# Patient Record
Sex: FEMALE | Race: WHITE | URBAN - METROPOLITAN AREA
[De-identification: names, ages, dates, MRNs, and addresses within clinical notes are randomized per-mention and may not be internally consistent; named-entity substitution may affect disease eponyms.]

---

## 2018-06-04 ENCOUNTER — THERAPY VISIT (OUTPATIENT)
Dept: PHYSICAL THERAPY | Facility: CLINIC | Age: 53
End: 2018-06-04
Payer: COMMERCIAL

## 2018-06-04 DIAGNOSIS — R10.2 PELVIC PAIN IN FEMALE: Primary | ICD-10-CM

## 2018-06-04 PROCEDURE — 97161 PT EVAL LOW COMPLEX 20 MIN: CPT | Mod: GP | Performed by: PHYSICAL THERAPIST

## 2018-06-04 PROCEDURE — 97530 THERAPEUTIC ACTIVITIES: CPT | Mod: GP | Performed by: PHYSICAL THERAPIST

## 2018-06-04 NOTE — PROGRESS NOTES
[facility] Initial Evaluation  Subjective:  Patient is a 53 year old female presenting with rehab pelvic hpi. The history is provided by the patient. No  was used.                                      Past medical history: insulin resistance.  Medical allergies: none.  Other surgeries include:  None.  Medication history: Metformin, Vit D, Topamax.  Current occupation is [employer]  .  Patient is currently not working due to present treatment problem.      Barriers include:  None as reported by the patient.    Red flags:  None as reported by the patient.    SUBJECTIVE:  Patient reports onset of symptoms Pain originally started in 2016 and at that time had stopped having sex. Did attend PT at that time which did help. Gradually, started to initiate sex after completion of PT. Notes that she's more sexually active than before but still having some discomfort, arun with initial penetration. Symptoms include pain with intercourse yet. Wants me to check to see if there are any knots in her pelvic floor. Her  has been trying to do some finger penetration before sexual intercourse and that does seem to help. MD is debating trying to remove some belly fat/skin due to weight loss and having the extra skin. Pain that remains is a sharp pain and it's the initial penetration. Pt notes she tries to relax and sometimes does feel that maybe it's her. Also, notices that it's the position. Wants to be on her back and notes that hurts the most. Pt also notes that her  is curved to the right quite significantly.  Since onset symptoms have been getting better, worse or staying the same? Doing much better overall, just wants a recheck and wants opinion on the tummy tuck.      Verbal permission from patient to do internal pelvis muscle assessment? Yes Verbal permission to complete external perineal assessment? Yes Would you like someone else in the room as a   chaperone?  No      Urination:  Do you leak on the way to the bathroom or with a strong urge to void? No   Do you leak with cough,sneeze, jumping, running?sometimes   Any other activities that cause leaking? No   Do you have triggers that make you feel you can't wait to go to the bathroom? No what are theyNA.  Type of pad and number used per day? None  When you leak what is the amount? Small  How long can you delay the need to urinate? several hours.   How many times do you get up to urinate at night? 0   Can you stop the flow of urine when on the toilet? Yes  Is the volume of urine passed usually: average. (8sec rule= 250ml with average bladder storing 400-600ml)  Do you feel empty when you are done? No  Do you strain to pass urine? No  Do you have a slow or hesitant urinary stream? No  Do you have difficulty initiating the urine stream? No  Is urination painful? No  How many bladder infections have you had in last 12 months? 0  Fluid intake(one glass is 8oz or one cup) 8 glasses/day, 0 caffinated glasses/day  0  alcohol glasses/day.    Bowel habits:  Frequency of bowel movements? 2 times a day  Consistancy of stool? soft formed, [place] Stool Scale 4  Do you ignore the urge to defecate? No  Do you strain to pass stool? No    Pelvic Pain:  Do you have any pelvic pain with intercourse, exams, use of tampons? Yes  Is initial penetration during intercourse painful? Yes  Is deeper penetration painful? No  Do you use lubricant? Yes What kind? Helps only if he starts with his fingers first  ?  Given birth? No Any complications? Not asked  # of vaginal delieveries?0  # of C-sections?0  # of episiotomies?0.  Are you sexually active?Yes  Have you ever been worried for your physical safety? No    Do you have any depression, anxiety, panic attacks, excessive   stress?  No  Any abdominal or pelvic surgeries?  Breast reduction in 1988, ACL 2009, Hysterectomy 2002, 2 C-sections 1985 & 1987,   Are you having any regular exercise?Yes,  stretches she was given before  Have you practiced the PF(kegel) exercises for 4 or more weeks? No, isn't supposed to be doing these  Thyroid checked?NA(related to hair loss, flu-like symptoms, wt gain/loss, fatigue, menopause)  Changed diet lately?No    OBSERVATION  Lumbar Posture: Negative  Pelvic symmetry: Negative  Introitus: unremarkable  Trendelenberg Sign:Not Tested: Not indicated     ROM  Single leg standing unilateral hip flexion PSIS:Negative  Standing forward flexion PSIS:Negative  Passive Hip ROM:Negative  HUSEYIN:Negative  HUSEYIN with OP:Negative      MUSCLE PERFORMANCE  Active SLR:Not Tested: Not indicated   Abdominal Core 90/90 hip lower:Not Tested: Not indicated   Baseline PF tone:normal with very mild tightness in levator ani B  PF Tone with cough: Able to hold without difficulty  Valsalva: no movement  PF Response quality: brisk - normal  PF Power: Center: 5 Stronger on right/left symmetrical.  Endurance: Maximum contraction in seconds: 10 seconds  # of endurance contractions before fatigue: NT  Quick contraction repetitions prior to fatigue: 8.  Specificity/accessory muscles: Not Tested, Synergy with abdominals: Not Tested.  Prolapse: Cyctocele/Rectocele/Uterine stgstrstastdstest:st st1st Urethrocele: none, Enterocele: none.    PALPATION: Pain: no pain or tenderness to palpation noted.  Objective:  System    Physical Exam    General     ROS    Assessment/Plan:    Patient is a 53 year old female with pelvic complaints.    Patient has the following significant findings with corresponding treatment plan.                Diagnosis 1:  Pelvic pain  Pain -  hot/cold therapy, manual therapy, self management, education and home program  Decreased ROM/flexibility - manual therapy, therapeutic exercise, therapeutic activity and home program  Impaired muscle performance - neuro re-education and home program  Decreased function - therapeutic activities and home program    Therapy Evaluation Codes:   1) History comprised  of:   Personal factors that impact the plan of care:      None.    Comorbidity factors that impact the plan of care are:      None.     Medications impacting care: Metformin, Vitamin D, Topamax.  2) Examination of Body Systems comprised of:   Body structures and functions that impact the plan of care:      Pelvis.   Activity limitations that impact the plan of care are:      [place].  3) Clinical presentation characteristics are:   Stable/Uncomplicated.  4) Decision-Making    Low complexity using standardized patient assessment instrument and/or measureable assessment of functional outcome.  Cumulative Therapy Evaluation is: Low complexity.    Previous and current functional limitations:  (See Goal Flow Sheet for this information)    Short term and Long term goals: (See Goal Flow Sheet for this information)     Communication ability:  Patient appears to be able to clearly communicate and understand verbal and written communication and follow directions correctly.  Treatment Explanation - The following has been discussed with the patient:   RX ordered/plan of care  Anticipated outcomes  Possible risks and side effects  This patient would benefit from PT intervention to resume normal activities.   Rehab potential is good.    Frequency:  1 X week, once daily. Pt would like to see how things go on her own at this time however. If she chooses to return, will work on releasing mild tightness in pelvic floor and control of the pelvic floor.   Duration:  for 2-3 weeks  Discharge Plan:  Achieve all LTG.  Independent in home treatment program.  Reach maximal therapeutic benefit.    Please refer to the daily flowsheet for treatment today, total treatment time and time spent performing 1:1 timed codes.

## 2018-06-04 NOTE — LETTER
[facility] PHYSICAL THERAPY  [address] [address]  [phone]    2018    Re: Lilliam Jaimes   :   1965  MRN:  [id number]   [facility] PHYSIC[facility]    Date of Initial Evaluation:  2018  Visits:  Rxs Used: 1  Reason for Referral:  Pelvic pain in female    EVALUATION SUMMARY    [facility] Initial Evaluation  Subjective:  Patient is a 53 year old female presenting with rehab pelvic hpi. The history is provided by the patient. No  was used.Past medical history: insulin resistance.  Medical allergies: none.  Other surgeries include:  None.  Medication history: Metformin, Vit D, Topamax.  Current occupation is [employer].  Patient is currently not working due to present treatment problem.  Barriers include:  None as reported by the patient. Red flags:  None as reported by the patient.  SUBJECTIVE:  Patient reports onset of symptoms Pain originally started in  and at that time had stopped having sex. Did attend PT at that time which did help. Gradually, started to initiate sex after completion of PT. Notes that she's more sexually active than before but still having some discomfort, arun with initial penetration. Symptoms include pain with intercourse yet. Wants me to check to see if there are any knots in her pelvic floor. Her  has been trying to do some finger penetration before sexual intercourse and that does seem to help. MD is debating trying to remove some belly fat/skin due to weight loss and having the extra skin. Pain that remains is a sharp pain and it's the initial penetration. Pt notes she tries to relax and sometimes does feel that maybe it's her. Also, notices that it's the position. Wants to be on her back and notes that hurts the most. Pt also notes that her  is curved to the right quite significantly.  Since onset symptoms have been getting better, worse or  staying the same? Doing much better overall, just wants a recheck and wants opinion on the tummy tuck.    Verbal permission from patient to do internal pelvis muscle    assessment? Yes Verbal permission to complete external perineal   assessment? Yes Would you like someone else in the room as a   chaperone? No  Urination: Do you leak on the way to the bathroom or with a strong urge to void? No   Do you leak with cough,sneeze, jumping, running?sometimes   Any other activities that cause leaking? No   Do you have triggers that make you feel you can't wait to go to the bathroom? No what are theyNA.  Type of pad and number used per day? None  When you leak what is the amount? Small  How long can you delay the need to urinate? several hours.   How many times do you get up to urinate at night? 0   Can you stop the flow of urine when on the toilet? Yes  Is the volume of urine passed usually: average. (8sec rule= 250ml with average bladder storing 400-600ml)  Do you feel empty when you are done? No  Do you strain to pass urine? No  Do you have a slow or hesitant urinary stream? No  Do you have difficulty initiating the urine stream? No  Is urination painful? No  How many bladder infections have you had in last 12 months? 0  Fluid intake(one glass is 8oz or one cup) 8 glasses/day, 0 caffinated glasses/day  0  alcohol glasses/day.  Bowel habits:  Frequency of bowel movements? 2 times a day  Consistancy of stool? soft formed, [place] Stool Scale 4  Do you ignore the urge to defecate? No  Do you strain to pass stool? No  Pelvic Pain:  Do you have any pelvic pain with intercourse, exams, use of tampons? Yes  Is initial penetration during intercourse painful? Yes  Is deeper penetration painful? No  Do you use lubricant? Yes What kind? Helps only if he starts with his fingers first  Given birth? No Any complications? Not asked  # of vaginal delieveries?0  # of C-sections?0  # of episiotomies?0.  Are you sexually active?Yes  Have  you ever been worried for your physical safety? No    Do you have any depression, anxiety, panic attacks, excessive   stress?  No  Any abdominal or pelvic surgeries?  Breast reduction in 1988, ACL 2009, Hysterectomy 2002, 2 C-sections 1985 & 1987,    Are you having any regular exercise?Yes, stretches she was given before  Have you practiced the PF(kegel) exercises for 4 or more weeks? No, isn't supposed to be doing these  Thyroid checked?NA(related to hair loss, flu-like symptoms, wt gain/loss, fatigue, menopause)  Changed diet lately?No  OBSERVATION  Lumbar Posture: Negative  Pelvic symmetry: Negative  Introitus: unremarkable  Trendelenberg Sign:Not Tested: Not indicated   ROM  Single leg standing unilateral hip flexion PSIS:Negative  Standing forward flexion PSIS:Negative  Passive Hip ROM:Negative  HUSEYIN:Negative  HUSEYIN with OP:Negative  MUSCLE PERFORMANCE  Active SLR:Not Tested: Not indicated   Abdominal Core 90/90 hip lower:Not Tested: Not indicated   Baseline PF tone:normal with very mild tightness in levator ani B  PF Tone with cough: Able to hold without difficulty  Valsalva: no movement  PF Response quality: brisk - normal  PF Power: Center: 5 Stronger on right/left symmetrical.  Endurance: Maximum contraction in seconds: 10 seconds  # of endurance contractions before fatigue: NT  Quick contraction repetitions prior to fatigue: 8.  Specificity/accessory muscles: Not Tested, Synergy with abdominals: Not Tested.  Prolapse: Cyctocele/Rectocele/Uterine stgstrstastdstest:st st1st Urethrocele: none, Enterocele: none.  PALPATION: Pain: no pain or tenderness to palpation noted.    Assessment/Plan:    Patient is a 53 year old female with pelvic complaints.    Patient has the following significant findings with corresponding treatment plan.                Diagnosis 1:  Pelvic pain  Pain -  hot/cold therapy, manual therapy, self management, education and home program  Decreased ROM/flexibility - manual therapy, therapeutic exercise,  therapeutic activity and home program  Impaired muscle performance - neuro re-education and home program  Decreased function - therapeutic activities and home program  Therapy Evaluation Codes:   1) History comprised of:   Personal factors that impact the plan of care:      None.    Comorbidity factors that impact the plan of care are:      None.     Medications impacting care: Metformin, Vitamin D, Topamax.  2) Examination of Body Systems comprised of:   Body structures and functions that impact the plan of care:      Pelvis.   Activity limitations that impact the plan of care are:      [place].  3) Clinical presentation characteristics are:   Stable/Uncomplicated.  4) Decision-Making    Low complexity using standardized patient assessment instrument and/or measureable assessment of functional outcome.  Cumulative Therapy Evaluation is: Low complexity.  Previous and current functional limitations:  (See Goal Flow Sheet for this information)    Short term and Long term goals: (See Goal Flow Sheet for this information)   Communication ability:  Patient appears to be able to clearly communicate and understand verbal and written communication and follow directions correctly.  Treatment Explanation - The following has been discussed with the patient:   RX ordered/plan of care  Anticipated outcomes  Possible risks and side effects  This patient would benefit from PT intervention to resume normal activities.   Rehab potential is good.  Frequency:  1 X week, once daily. Pt would like to see how things go on her own at this time however. If she chooses to return, will work on releasing mild tightness in pelvic floor and control of the pelvic floor.   Duration:  for 2-3 weeks  Discharge Plan:  Achieve all LTG.  Independent in home treatment program.  Reach maximal therapeutic benefit.      Thank you for your referral.    INQUIRIES  Therapist: Yana Anthony, PT  [facility] PHYSICAL THERAPY  [address]  [facility] [address]  Phone: [phone]  Fax: [phone]

## 2018-10-01 PROBLEM — R10.2 PELVIC PAIN IN FEMALE: Status: RESOLVED | Noted: 2018-06-04 | Resolved: 2018-10-01
